# Patient Record
Sex: FEMALE | Race: WHITE
[De-identification: names, ages, dates, MRNs, and addresses within clinical notes are randomized per-mention and may not be internally consistent; named-entity substitution may affect disease eponyms.]

---

## 2017-07-17 ENCOUNTER — HOSPITAL ENCOUNTER (OUTPATIENT)
Dept: HOSPITAL 10 - OBT | Age: 42
Discharge: HOME | End: 2017-07-17
Attending: OBSTETRICS & GYNECOLOGY
Payer: MEDICAID

## 2017-07-17 VITALS
WEIGHT: 194.01 LBS | WEIGHT: 194.01 LBS | BODY MASS INDEX: 27.16 KG/M2 | BODY MASS INDEX: 27.16 KG/M2 | HEIGHT: 71 IN | HEIGHT: 71 IN

## 2017-07-17 VITALS — SYSTOLIC BLOOD PRESSURE: 113 MMHG | RESPIRATION RATE: 18 BRPM | HEART RATE: 89 BPM | DIASTOLIC BLOOD PRESSURE: 74 MMHG

## 2017-07-17 DIAGNOSIS — Z3A.40: ICD-10-CM

## 2017-07-17 DIAGNOSIS — O48.0: Primary | ICD-10-CM

## 2017-07-17 DIAGNOSIS — R10.9: ICD-10-CM

## 2017-07-17 PROCEDURE — 76818 FETAL BIOPHYS PROFILE W/NST: CPT

## 2017-07-17 PROCEDURE — G0463 HOSPITAL OUTPT CLINIC VISIT: HCPCS

## 2017-07-17 NOTE — RADRPT
PROCEDURE:   OB ultrasound for biophysical profile 

 

CLINICAL INDICATION:   Contractions

 

TECHNIQUE:   Multiple sonographic images of the pelvis were obtained.  Transabdominal views of the g
ravid uterus are available for review.  The images were reviewed on a PACS workstation.  

 

COMPARISON:   None 

 

FINDINGS:

 

Fetal breathing movement = 2/2

Fetal tone = 2/2

Fetal motion = 2/2

RAMIRO = 2/2

 

RAMIRO = 15.3 cm

Single live intrauterine pregnancy with fetal cardiac activity of 154 bpm.  Fetal position is cephal
ic.  The placenta is posterior.

 

IMPRESSION:

 

1.  Single live intrauterine gestation.  

2.  Biophysical profile = 8/8.

3.  RAMIRO = 15.3 cm. 

 

 

RPTAT: HH

_____________________________________________ 

.Annita Martinez MD, MD           Date    Time 

Electronically viewed and signed by .Annita Martinez MD, MD on 07/17/2017 09:38 

 

D:  07/17/2017 09:38  T:  07/17/2017 09:38

.G/

## 2017-07-20 ENCOUNTER — HOSPITAL ENCOUNTER (INPATIENT)
Dept: HOSPITAL 10 - OBT | Age: 42
LOS: 3 days | Discharge: HOME | End: 2017-07-23
Attending: OBSTETRICS & GYNECOLOGY | Admitting: OBSTETRICS & GYNECOLOGY
Payer: MEDICAID

## 2017-07-20 VITALS
BODY MASS INDEX: 26.76 KG/M2 | BODY MASS INDEX: 26.76 KG/M2 | WEIGHT: 191.14 LBS | HEIGHT: 71 IN | HEIGHT: 71 IN | WEIGHT: 191.14 LBS

## 2017-07-20 VITALS — SYSTOLIC BLOOD PRESSURE: 124 MMHG | DIASTOLIC BLOOD PRESSURE: 61 MMHG

## 2017-07-20 DIAGNOSIS — O48.0: Primary | ICD-10-CM

## 2017-07-20 DIAGNOSIS — Z3A.40: ICD-10-CM

## 2017-07-20 LAB
ADD SCAN DIFF: NO
APTT BLD: 24.7 SEC (ref 25–35)
BASOPHILS # BLD AUTO: 0 10^3/UL (ref 0–0.1)
BASOPHILS NFR BLD: 0.3 % (ref 0–2)
EOSINOPHIL # BLD: 0 10^3/UL (ref 0–0.5)
EOSINOPHIL NFR BLD: 0.3 % (ref 0–7)
ERYTHROBLAST% (NRBC) (M): 0 /100WBC (ref 0–0)
ERYTHROCYTE [DISTWIDTH] IN BLOOD BY AUTOMATED COUNT: 12.7 % (ref 11.5–14.5)
HCT VFR BLD CALC: 38.7 % (ref 37–47)
HGB BLD-MCNC: 13.1 G/DL (ref 12–16)
INR PPP: 0.97
LYMPHOCYTES # BLD AUTO: 1.5 10^3/UL (ref 0.8–2.9)
LYMPHOCYTES NFR BLD AUTO: 12.7 % (ref 15–51)
MCH RBC QN AUTO: 32.3 PG (ref 29–33)
MCHC RBC AUTO-ENTMCNC: 33.9 G/DL (ref 32–37)
MCV RBC AUTO: 95.6 FL (ref 82–101)
MONOCYTES # BLD: 0.7 10^3/UL (ref 0.3–0.9)
MONOCYTES NFR BLD: 6.1 % (ref 0–11)
NEUTROPHILS # BLD: 9.3 10^3/UL (ref 1.6–7.5)
NEUTROPHILS NFR BLD AUTO: 79.8 % (ref 39–77)
NRBC # BLD MANUAL: 0 10^3/UL (ref 0–0)
PLATELET # BLD: 138 10^3/UL (ref 140–415)
PMV BLD AUTO: 12.9 FL (ref 7.4–10.4)
PROTHROMBIN TIME: 12.9 SEC (ref 12.2–14.2)
PT RATIO: 1
RBC # BLD AUTO: 4.05 10^6/UL (ref 4.2–5.4)
WBC # BLD AUTO: 11.6 10^3/UL (ref 4.8–10.8)

## 2017-07-20 PROCEDURE — 85730 THROMBOPLASTIN TIME PARTIAL: CPT

## 2017-07-20 PROCEDURE — 86900 BLOOD TYPING SEROLOGIC ABO: CPT

## 2017-07-20 PROCEDURE — 86592 SYPHILIS TEST NON-TREP QUAL: CPT

## 2017-07-20 PROCEDURE — 87340 HEPATITIS B SURFACE AG IA: CPT

## 2017-07-20 PROCEDURE — 85025 COMPLETE CBC W/AUTO DIFF WBC: CPT

## 2017-07-20 PROCEDURE — G0463 HOSPITAL OUTPT CLINIC VISIT: HCPCS

## 2017-07-20 PROCEDURE — 86901 BLOOD TYPING SEROLOGIC RH(D): CPT

## 2017-07-20 PROCEDURE — 90715 TDAP VACCINE 7 YRS/> IM: CPT

## 2017-07-20 PROCEDURE — 76818 FETAL BIOPHYS PROFILE W/NST: CPT

## 2017-07-20 PROCEDURE — 62319: CPT

## 2017-07-20 PROCEDURE — 90716 VAR VACCINE LIVE SUBQ: CPT

## 2017-07-20 PROCEDURE — 85610 PROTHROMBIN TIME: CPT

## 2017-07-20 RX ADMIN — Medication SCH MLS/HR: at 11:51

## 2017-07-20 RX ADMIN — PYRIDOXINE HYDROCHLORIDE SCH MLS/HR: 100 INJECTION, SOLUTION INTRAMUSCULAR; INTRAVENOUS at 23:33

## 2017-07-20 RX ADMIN — PYRIDOXINE HYDROCHLORIDE SCH MLS/HR: 100 INJECTION, SOLUTION INTRAMUSCULAR; INTRAVENOUS at 18:31

## 2017-07-20 RX ADMIN — PYRIDOXINE HYDROCHLORIDE SCH MLS/HR: 100 INJECTION, SOLUTION INTRAMUSCULAR; INTRAVENOUS at 11:07

## 2017-07-20 NOTE — HP
Date/Time of Note


Date/Time of Note


DATE: 17 


TIME: 17:23





OB - History


Hx of Present Pregnancy


Free Text/Dictation


Admitted for elective induction at 40+ weeks


Last Menstrual Period:  Oct 10, 2016


Estimated Due Date:  2017


:  1


Para:  0


Prenatal Care:  Good Care


Ultrasounds:  Normal mid trimester US


Obstetrical Complications:  None


Medical Complications:  None





Past Family/Social History


*


Past Medical, Surgical, Family and Obstetric Histories reviewed from prenatal 

chart.


Blood Type:  A+


Rubella:  immune


RPR/VDRL:  Negative


GBS Status:  Negative


HBsAG:  Negative





OB  Admission Exam


Vital Signs


Vital Signs





 Vital Signs








  Date Time  Temp Pulse Resp B/P Pulse Ox O2 Delivery O2 Flow Rate FiO2


 


17 08:55 97.7   124/61  Room Air  











Physical Exam


HEENT:  WNL


Heart:  Rhythm Normal


Lungs:  Clear, Equal


Abdomen:  WNL


Extremities:  Normal


Reflexes:  Normal


Cervical Dilatation:  1cm


Effacement:  0%


Station:  -3


Membranes:  Intact


Fetal Heart Rate:  130's


Accelerations:  Accelerations Present


Decelerations:  Variable Decelerations


Varibility:  Marked


Contractions on Admission:  >10 Minutes Apart


Last 72 hours Lab Results


 CBC & BMP


17 11:07











OB  Assessment/Plan


Reason for admission:  induction of labor


Other Assessment:


40+ weeks gestation


Negative contraction stress test


Induction Method:  per Misoprostol Protocol











ABBY MARIO MD 2017 17:25

## 2017-07-20 NOTE — TRIAGE
===================================

OB Triage

===================================

Datetime Report Generated by CPN: 07/20/2017 10:23

   

   

===========================

Datetime: 07/20/2017 08:58

===========================

   

 Stage of Pregnancy:  OB Triage

 Assessment Type:  Triage

   

===================================

Maternal Assessment

===================================

   

 Level of Consciousness:  Fully Conscious

 DTR's/Clonus:  DTRs 2+; No Clonus

 Headache:  Denies

 Blurred Vision:  No

 Respiratory Effort:  Unlabored; Regular Rhythm; Equal Expansion

 Breath Sounds, Left:  Clear and Equal

 Breath Sounds, Right:  Clear and Equal

 Nausea/Vomiting:  Denies

 RUQ Epigastric Pain:  Denies

 Lower Extremities Edema:  None

     Degree:  None

 Upper Extremities Edema:  None

     Degree:  None

 Facial Edema:  None

 Temperature Route:  Oral

   

===================================

Fall Risk Assessment

===================================

   

 History of Falling:  (0) No

 Secondary Diagnosis:  (0) No

 Ambulatory Aid:  (0) Bedrest/Nurse Assist

 IV Therapy:  (0) No

 Gait:  (0) Normal/Bedrest/Immobile

 Mental Status:  (0) Oriented to Own Ability

 Fall Score:  0

 Fall Risk Score Definition:  No Risk: No action required

   

===================================

Labor Evaluation

===================================

   

 Monitor Mode:  External

   

===================================

Fetal Heart Rate

===================================

   

 FHR Baseline Rate:  145

 Monitor Mode:  External US

 Variability:  Moderate 6-25 bpm

 Accelerations:  15X15

 Decelerations:  None

   

===================================

Pain Assessment

===================================

   

 Pain Scale:  0

 Pain Presence:  None/Denies

 Pain Type:  N/A

   

===========================

Datetime: 07/20/2017 08:57

===========================

   

 Time of Arrival:  07/20/2017 08:43

 EGA:  40.3

 Arrived By:  Ambulatory

 Arrived From:  Home

 Chief Complaint:  F/UP NST BPP FOR POST DATES

 Fetal Movement:  Present

 Contractions:  Denies/Absent

 Rupture of Membranes:  Denies

 Vaginal Bleeding:  None

 Vaginal Discharge:  Denies

 Recent Sexual Intercouse:  Denies

 Abdominal Trauma:  Not Applicable

 Patient Complaints:  None

 Initial Plan:  NST, BPP FOR FOR POST DATES 

   

===========================

Datetime: 07/20/2017 08:56

===========================

   

   

===================================

Vaginal Exam

===================================

   

 Dilatation (cms):  1.0

 Effacement (%):  60

 Station:  -2

 Exam By:  VIVIAN HENDRICKSON 

   

===========================

Datetime: 07/17/2017 08:52

===========================

   

 Assessment Type:  Triage

   

===================================

Maternal Assessment

===================================

   

 Level of Consciousness:  Fully Conscious

 DTR's/Clonus:  DTRs 2+; No Clonus

 Headache:  Denies

 Blurred Vision:  No

 Respiratory Effort:  Unlabored; Regular Rhythm; Equal Expansion

 Breath Sounds, Left:  Clear and Equal

 Breath Sounds, Right:  Clear and Equal

 Nausea/Vomiting:  Denies

 RUQ Epigastric Pain:  Denies

 Lower Extremities Edema:  None

     Degree:  None

 Upper Extremities Edema:  None

     Degree:  None

 Facial Edema:  None

   

===================================

Fall Risk Assessment

===================================

   

 History of Falling:  (0) No

 Secondary Diagnosis:  (0) No

 Ambulatory Aid:  (0) Bedrest/Nurse Assist

 IV Therapy:  (0) No

 Gait:  (0) Normal/Bedrest/Immobile

 Mental Status:  (0) Oriented to Own Ability

 Fall Score:  0

 Fall Risk Score Definition:  No Risk: No action required

   

===========================

Datetime: 07/17/2017 08:51

===========================

   

 Time of Arrival:  07/17/2017 08:40

 EGA:  40.0

 Arrived By:  Ambulatory

 Arrived From:  Home

 Chief Complaint:  PT STATES SHE WAS TOLD TO COME IN TODAY FOR IOL.

 Fetal Movement:  Present

 Contractions:  Denies/Absent

 Rupture of Membranes:  Denies

 Vaginal Bleeding:  None

 Vaginal Discharge:  Denies

 Recent Sexual Intercouse:  Denies

 Abdominal Trauma:  Not Applicable

 Patient Complaints:  None

 Time Provider Notified:  07/17/2017 10:04

 Provider Notified:  ROSLYN

 Initial Plan:  NST/BPP

   

===========================

Datetime: 07/17/2017 08:49

===========================

   

   

===================================

Labor Evaluation

===================================

   

 Monitor Mode:  External

 Monitor Mode:  External US

## 2017-07-21 PROCEDURE — 3E033VJ INTRODUCTION OF OTHER HORMONE INTO PERIPHERAL VEIN, PERCUTANEOUS APPROACH: ICD-10-PCS | Performed by: OBSTETRICS & GYNECOLOGY

## 2017-07-21 PROCEDURE — 0UQGXZZ REPAIR VAGINA, EXTERNAL APPROACH: ICD-10-PCS | Performed by: OBSTETRICS & GYNECOLOGY

## 2017-07-21 RX ADMIN — PYRIDOXINE HYDROCHLORIDE SCH MLS/HR: 100 INJECTION, SOLUTION INTRAMUSCULAR; INTRAVENOUS at 02:00

## 2017-07-21 RX ADMIN — PYRIDOXINE HYDROCHLORIDE SCH MLS/HR: 100 INJECTION, SOLUTION INTRAMUSCULAR; INTRAVENOUS at 11:01

## 2017-07-21 RX ADMIN — PYRIDOXINE HYDROCHLORIDE SCH MLS/HR: 100 INJECTION, SOLUTION INTRAMUSCULAR; INTRAVENOUS at 08:27

## 2017-07-21 RX ADMIN — FENTANYL CIT 0.2 MG/100ML-ROPIV 0.2%-NACL 0.9% EPIDURAL INJ SCH ML: 2/0.2 SOLUTION at 09:15

## 2017-07-21 RX ADMIN — CEPHALEXIN SCH MG: 500 CAPSULE ORAL at 23:43

## 2017-07-21 RX ADMIN — FENTANYL CIT 0.2 MG/100ML-ROPIV 0.2%-NACL 0.9% EPIDURAL INJ SCH ML: 2/0.2 SOLUTION at 18:49

## 2017-07-21 RX ADMIN — Medication SCH MLS/HR: at 02:29

## 2017-07-21 RX ADMIN — PYRIDOXINE HYDROCHLORIDE SCH MLS/HR: 100 INJECTION, SOLUTION INTRAMUSCULAR; INTRAVENOUS at 18:50

## 2017-07-21 RX ADMIN — IBUPROFEN SCH MG: 600 TABLET ORAL at 23:44

## 2017-07-21 NOTE — LDN
Date/Time of Note


Date/Time of Note


DATE: 7/21/17 


TIME: 22:12





Delivery Summary


Vacuum extraction of a viable infant over midline episiotomy


Weeks of Gestation


40+


Assisted Vaginal Delivery:  Vacuum


Placenta Delivered:  Spontaneously, Intact & Complete


Meconium:  none


Episiotomy:  Yes


Indication for episiotomy


Prolonged second stage of the labor


Profuse vaginal bleeding


Perineal laceration:  0


Laceration repair:  


Vaginal laceration on


the right side was


repaired using 2-0


Vicryl stitch hemostasis


became secure. pisiotomy


was then repaired in


layers with 2-0 Vicryl


and 2-0 chromic


hemostasis became sick


Anesthesia type:  Epidural


Estimated blood loss:  700


Sponge & Needle done & correct:  Yes


All needle counts correct:  Yes


Any foreign bodies felt in the:  No


Problems:  





Infant Delivery Information


Sex


Infant Sex:  female





Apgars


1 Minute:  9


5 Minute:  9





Suctioning


Nose & mouth suctioned at delaney:  Yes


Delee suction performed:  No





Umbilical Cord


Umbilical cord with:  3 Vessels


Cord presentations:  nuchal cord


Nuchal cord present X:  1


Cord Blood was obtained:  Yes





Mother & Baby Disposition


Disposition


Mom & Baby to Maternity; Good:  Yes (Mother and baby were recovered in good 

condition)


Mom transferred to:  Other (Maternity)


Baby to NICU:  No











ABBY MARIO MD Jul 21, 2017 22:15

## 2017-07-22 VITALS — DIASTOLIC BLOOD PRESSURE: 53 MMHG | RESPIRATION RATE: 18 BRPM | HEART RATE: 71 BPM | SYSTOLIC BLOOD PRESSURE: 88 MMHG

## 2017-07-22 VITALS — RESPIRATION RATE: 16 BRPM | DIASTOLIC BLOOD PRESSURE: 51 MMHG | SYSTOLIC BLOOD PRESSURE: 102 MMHG | HEART RATE: 84 BPM

## 2017-07-22 VITALS — HEART RATE: 77 BPM | SYSTOLIC BLOOD PRESSURE: 96 MMHG | DIASTOLIC BLOOD PRESSURE: 55 MMHG | RESPIRATION RATE: 17 BRPM

## 2017-07-22 VITALS — DIASTOLIC BLOOD PRESSURE: 55 MMHG | HEART RATE: 80 BPM | SYSTOLIC BLOOD PRESSURE: 106 MMHG | RESPIRATION RATE: 19 BRPM

## 2017-07-22 VITALS — HEART RATE: 86 BPM | DIASTOLIC BLOOD PRESSURE: 57 MMHG | RESPIRATION RATE: 17 BRPM | SYSTOLIC BLOOD PRESSURE: 94 MMHG

## 2017-07-22 LAB
ABNORMAL IP MESSAGE: 1
BASOPHILS # BLD AUTO: 0.1 10^3/UL (ref 0–0.1)
BASOPHILS NFR BLD: 0.2 % (ref 0–2)
EOSINOPHIL # BLD: 0.1 10^3/UL (ref 0–0.5)
EOSINOPHIL NFR BLD: 0.3 % (ref 0–7)
ERYTHROCYTE [DISTWIDTH] IN BLOOD BY AUTOMATED COUNT: 12.9 % (ref 11.5–14.5)
HCT VFR BLD CALC: 29.8 % (ref 37–47)
HGB BLD-MCNC: 10.2 G/DL (ref 12–16)
LYMPHOCYTES # BLD AUTO: 1.2 10^3/UL (ref 0.8–2.9)
LYMPHOCYTES NFR BLD AUTO: 5.2 % (ref 15–51)
MCH RBC QN AUTO: 33 PG (ref 29–33)
MCHC RBC AUTO-ENTMCNC: 34.2 G/DL (ref 32–37)
MCV RBC AUTO: 96.4 FL (ref 82–101)
MONOCYTES # BLD: 1.2 10^3/UL (ref 0.3–0.9)
MONOCYTES NFR BLD: 5.2 % (ref 0–11)
NEUTROPHILS # BLD: 19.7 10^3/UL (ref 1.6–7.5)
NEUTROPHILS NFR BLD AUTO: 88.3 % (ref 39–77)
NRBC # BLD MANUAL: 0 10^3/UL (ref 0–0)
NRBC BLD AUTO-RTO: 0 /100WBC (ref 0–0)
PLATELET # BLD: 125 10^3/UL (ref 140–415)
PMV BLD AUTO: 13.2 FL (ref 7.4–10.4)
POSITIVE DIFF: (no result)
RBC # BLD AUTO: 3.09 10^6/UL (ref 4.2–5.4)
WBC # BLD AUTO: 22.3 10^3/UL (ref 4.8–10.8)

## 2017-07-22 RX ADMIN — IBUPROFEN SCH MG: 600 TABLET ORAL at 17:31

## 2017-07-22 RX ADMIN — CEPHALEXIN SCH MG: 500 CAPSULE ORAL at 11:47

## 2017-07-22 RX ADMIN — DOCUSATE SODIUM AND SENNOSIDES SCH TAB: 8.6; 5 TABLET, FILM COATED ORAL at 08:20

## 2017-07-22 RX ADMIN — IBUPROFEN SCH MG: 600 TABLET ORAL at 11:47

## 2017-07-22 RX ADMIN — DOCUSATE SODIUM AND SENNOSIDES SCH TAB: 8.6; 5 TABLET, FILM COATED ORAL at 20:53

## 2017-07-22 RX ADMIN — IBUPROFEN SCH MG: 600 TABLET ORAL at 05:45

## 2017-07-22 RX ADMIN — CEPHALEXIN SCH MG: 500 CAPSULE ORAL at 17:31

## 2017-07-22 RX ADMIN — AMPICILLIN SODIUM AND SULBACTAM SODIUM SCH MLS/HR: 2; 1 INJECTION, POWDER, FOR SOLUTION INTRAMUSCULAR; INTRAVENOUS at 19:46

## 2017-07-22 RX ADMIN — CEPHALEXIN SCH MG: 500 CAPSULE ORAL at 05:45

## 2017-07-22 RX ADMIN — IBUPROFEN SCH MG: 600 TABLET ORAL at 23:58

## 2017-07-22 NOTE — PD.PPDC
OB/GYN Discharge Instruction


Provider Information


Physician Information


S/P vaginal delivery





Diagnosis


Final Diagnosis:  S/P vaginal delivery





Condition


Patient Condition:  Good





Diet


Diet:  Resume Regular Diet





Activity/Restrictions








Activity:   Normal Activity





 May Shower














Restrictions:   Nothing in the Vagina





Return to Work or School:  Sep 11, 2017





Follow-up


Follow-up with Physician:  4, Week/Weeks





Return to clinic for








GYN Instructions:   Fever greater than 101














OB Instructions:   Breast Tenderness





 Depression

















ABBY MARIO MD Jul 22, 2017 18:26

## 2017-07-22 NOTE — DS
Date/Time of Note


Date/Time of Note


home next day 


DATE: 7/22/17 


TIME: 18:23





Obstetrical Discharge Record


Final Diagnosis


Final Diagnosis:  Term delivered


Other Final Diagnosis


S/P vaginal delivery





Vaginal Delivery


Obstetrical Delivery:  Episiotomy, Repaired, Vacuum Extraction, Laceration, 

Repaired





Complications


Augmentation:  Yes


Induction:  Yes





Condition on Discharge


Physical Assessment


Last Vitals:


see nurses notes


Voiding:  Yes


Bowel Movement:  Yes


Breast:  Soft, non-tender, Filling


Fundus:  Firm


Abdomen and Incision:


soft BS +


Episiotomy:


healing


Calf Tenderness:  No


Patient Condition:  Good











ABBY MARIO MD Jul 22, 2017 18:24

## 2017-07-23 VITALS — HEART RATE: 72 BPM | SYSTOLIC BLOOD PRESSURE: 106 MMHG | RESPIRATION RATE: 19 BRPM | DIASTOLIC BLOOD PRESSURE: 54 MMHG

## 2017-07-23 VITALS — SYSTOLIC BLOOD PRESSURE: 102 MMHG | DIASTOLIC BLOOD PRESSURE: 58 MMHG | HEART RATE: 62 BPM | RESPIRATION RATE: 16 BRPM

## 2017-07-23 LAB
ABNORMAL IP MESSAGE: 1
BASOPHILS # BLD AUTO: 0 10^3/UL (ref 0–0.1)
BASOPHILS NFR BLD: 0.1 % (ref 0–2)
EOSINOPHIL # BLD: 0.2 10^3/UL (ref 0–0.5)
EOSINOPHIL NFR BLD: 1.2 % (ref 0–7)
ERYTHROCYTE [DISTWIDTH] IN BLOOD BY AUTOMATED COUNT: 13.2 % (ref 11.5–14.5)
HCT VFR BLD CALC: 27.2 % (ref 37–47)
HGB BLD-MCNC: 9.1 G/DL (ref 12–16)
LYMPHOCYTES # BLD AUTO: 1.7 10^3/UL (ref 0.8–2.9)
LYMPHOCYTES NFR BLD AUTO: 12 % (ref 15–51)
MCH RBC QN AUTO: 32.3 PG (ref 29–33)
MCHC RBC AUTO-ENTMCNC: 33.5 G/DL (ref 32–37)
MCV RBC AUTO: 96.5 FL (ref 82–101)
MONOCYTES # BLD: 1.1 10^3/UL (ref 0.3–0.9)
MONOCYTES NFR BLD: 8 % (ref 0–11)
NEUTROPHILS # BLD: 11 10^3/UL (ref 1.6–7.5)
NEUTROPHILS NFR BLD AUTO: 77.5 % (ref 39–77)
NRBC # BLD MANUAL: 0 10^3/UL (ref 0–0)
NRBC BLD AUTO-RTO: 0 /100WBC (ref 0–0)
PLATELET # BLD: 125 10^3/UL (ref 140–415)
PMV BLD AUTO: 13.5 FL (ref 7.4–10.4)
POSITIVE DIFF: (no result)
RBC # BLD AUTO: 2.82 10^6/UL (ref 4.2–5.4)
WBC # BLD AUTO: 14.2 10^3/UL (ref 4.8–10.8)

## 2017-07-23 RX ADMIN — IBUPROFEN SCH MG: 600 TABLET ORAL at 05:48

## 2017-07-23 RX ADMIN — AMPICILLIN SODIUM AND SULBACTAM SODIUM SCH MLS/HR: 2; 1 INJECTION, POWDER, FOR SOLUTION INTRAMUSCULAR; INTRAVENOUS at 11:48

## 2017-07-23 RX ADMIN — IBUPROFEN SCH MG: 600 TABLET ORAL at 11:45

## 2017-07-23 RX ADMIN — AMPICILLIN SODIUM AND SULBACTAM SODIUM SCH MLS/HR: 2; 1 INJECTION, POWDER, FOR SOLUTION INTRAMUSCULAR; INTRAVENOUS at 05:48

## 2017-07-23 RX ADMIN — DOCUSATE SODIUM AND SENNOSIDES SCH TAB: 8.6; 5 TABLET, FILM COATED ORAL at 08:41

## 2017-07-23 RX ADMIN — AMPICILLIN SODIUM AND SULBACTAM SODIUM SCH MLS/HR: 2; 1 INJECTION, POWDER, FOR SOLUTION INTRAMUSCULAR; INTRAVENOUS at 00:28

## 2017-08-31 ENCOUNTER — HOSPITAL ENCOUNTER (EMERGENCY)
Dept: HOSPITAL 10 - FTE | Age: 42
Discharge: HOME | End: 2017-08-31
Payer: MEDICAID

## 2017-08-31 VITALS — SYSTOLIC BLOOD PRESSURE: 128 MMHG | DIASTOLIC BLOOD PRESSURE: 75 MMHG | RESPIRATION RATE: 17 BRPM | HEART RATE: 56 BPM

## 2017-08-31 VITALS
HEIGHT: 62 IN | WEIGHT: 158.73 LBS | BODY MASS INDEX: 29.21 KG/M2 | HEIGHT: 62 IN | BODY MASS INDEX: 29.21 KG/M2 | WEIGHT: 158.73 LBS

## 2017-08-31 DIAGNOSIS — N30.01: ICD-10-CM

## 2017-08-31 DIAGNOSIS — K80.20: Primary | ICD-10-CM

## 2017-08-31 DIAGNOSIS — R11.0: ICD-10-CM

## 2017-08-31 LAB
ADD UMIC: YES
ALBUMIN SERPL-MCNC: 4.8 G/DL (ref 3.3–4.9)
ALBUMIN/GLOB SERPL: 1.26 {RATIO}
ALP SERPL-CCNC: 399 IU/L (ref 42–121)
ALT SERPL-CCNC: 247 IU/L (ref 13–69)
ANION GAP SERPL CALC-SCNC: 21 MMOL/L (ref 8–16)
AST SERPL-CCNC: 423 IU/L (ref 15–46)
BACTERIA #/AREA URNS HPF: (no result) /HPF
BASOPHILS # BLD AUTO: 0 10^3/UL (ref 0–0.1)
BASOPHILS NFR BLD: 0.4 % (ref 0–2)
BILIRUB DIRECT SERPL-MCNC: 0 MG/DL (ref 0–0.2)
BILIRUB SERPL-MCNC: 0.4 MG/DL (ref 0.2–1.3)
BUN SERPL-MCNC: 11 MG/DL (ref 7–20)
CALCIUM SERPL-MCNC: 10 MG/DL (ref 8.4–10.2)
CHLORIDE SERPL-SCNC: 101 MMOL/L (ref 97–110)
CO2 SERPL-SCNC: 26 MMOL/L (ref 21–31)
COLOR UR: YELLOW
CREAT SERPL-MCNC: 0.87 MG/DL (ref 0.44–1)
EOSINOPHIL # BLD: 0 10^3/UL (ref 0–0.5)
EOSINOPHIL NFR BLD: 0.3 % (ref 0–7)
ERYTHROCYTE [DISTWIDTH] IN BLOOD BY AUTOMATED COUNT: 11.5 % (ref 11.5–14.5)
GLOBULIN SER-MCNC: 3.8 G/DL (ref 1.3–3.2)
GLUCOSE SERPL-MCNC: 110 MG/DL (ref 70–220)
GLUCOSE UR STRIP-MCNC: NEGATIVE MG/DL
HCT VFR BLD CALC: 45.8 % (ref 37–47)
HGB BLD-MCNC: 14.9 G/DL (ref 12–16)
KETONES UR STRIP.AUTO-MCNC: NEGATIVE MG/DL
LYMPHOCYTES # BLD AUTO: 1.1 10^3/UL (ref 0.8–2.9)
LYMPHOCYTES NFR BLD AUTO: 11.5 % (ref 15–51)
MCH RBC QN AUTO: 30.5 PG (ref 29–33)
MCHC RBC AUTO-ENTMCNC: 32.5 G/DL (ref 32–37)
MCV RBC AUTO: 93.7 FL (ref 82–101)
MONOCYTES # BLD: 0.6 10^3/UL (ref 0.3–0.9)
MONOCYTES NFR BLD: 6.3 % (ref 0–11)
MUCOUS THREADS #/AREA URNS HPF: (no result) /HPF
NEUTROPHILS NFR BLD AUTO: 81.1 % (ref 39–77)
NITRITE UR QL STRIP.AUTO: POSITIVE MG/DL
NRBC # BLD MANUAL: 0 10^3/UL (ref 0–0)
NRBC BLD AUTO-RTO: 0 /100WBC (ref 0–0)
PLATELET # BLD: 217 10^3/UL (ref 140–415)
PMV BLD AUTO: 12 FL (ref 7.4–10.4)
POTASSIUM SERPL-SCNC: 4.4 MMOL/L (ref 3.5–5.1)
PROT SERPL-MCNC: 8.6 G/DL (ref 6.1–8.1)
RBC # BLD AUTO: 4.89 10^6/UL (ref 4.2–5.4)
RBC # UR AUTO: NEGATIVE MG/DL
SODIUM SERPL-SCNC: 144 MMOL/L (ref 135–144)
SQUAMOUS #/AREA URNS HPF: (no result) /HPF
UR ASCORBIC ACID: NEGATIVE MG/DL
UR BILIRUBIN (DIP): NEGATIVE MG/DL
UR CLARITY: (no result)
UR PH (DIP): 8 (ref 5–9)
UR RBC: 2 /HPF (ref 0–5)
UR SPECIFIC GRAVITY (DIP): 1.02 (ref 1–1.03)
UR TOTAL PROTEIN (DIP): NEGATIVE MG/DL
UROBILINOGEN UR STRIP-ACNC: (no result) MG/DL
WBC # BLD AUTO: 9.9 10^3/UL (ref 4.8–10.8)
WBC # UR STRIP: (no result) LEU/UL

## 2017-08-31 PROCEDURE — 84703 CHORIONIC GONADOTROPIN ASSAY: CPT

## 2017-08-31 PROCEDURE — 96375 TX/PRO/DX INJ NEW DRUG ADDON: CPT

## 2017-08-31 PROCEDURE — 80053 COMPREHEN METABOLIC PANEL: CPT

## 2017-08-31 PROCEDURE — 76705 ECHO EXAM OF ABDOMEN: CPT

## 2017-08-31 PROCEDURE — 81001 URINALYSIS AUTO W/SCOPE: CPT

## 2017-08-31 PROCEDURE — 85025 COMPLETE CBC W/AUTO DIFF WBC: CPT

## 2017-08-31 PROCEDURE — 83690 ASSAY OF LIPASE: CPT

## 2017-08-31 PROCEDURE — C9113 INJ PANTOPRAZOLE SODIUM, VIA: HCPCS

## 2017-08-31 PROCEDURE — 96374 THER/PROPH/DIAG INJ IV PUSH: CPT

## 2017-08-31 NOTE — ERD
ER Documentation


Chief Complaint


Date/Time


DATE: 8/31/17 


TIME: 17:07


Chief Complaint


Complains of abdominal pain x 3 days





HPI


This 42-year-old female presents to emergency department for complaint of 

epigastric pain radiating to her back.  Patient reports she has feels the back 

pain bilaterally.  Patient denies any dysuria, vomiting, reports intermittent 

nausea, patient is one-month postpartum, breast-feeding, states she went to a 

clinic last week was started on ranitidine 150 mg 1 tab p.o. twice daily, given 

hydralazine for nausea.  Patient has been taking medication as prescribed, pt 

reports pain is worse today medication is not helping.  Patient denies chest 

pain, shortness of breath, midsternal burning or pain.  Pain is localized to 

epigastric and periumbilical.





ROS


All systems reviewed and are negative except as per history of present illness.





Medications


Home Meds


Active Scripts


Hydrocodone/Acetaminophen (Norco 5-325 Tablet) 1 Each Tablet, 1 TAB PO Q6 Y for 

PAIN, #20 TAB


   Prov:OYEL,FRANCISCO         8/31/17


Tramadol HCl (Tramadol HCl) 50 Mg Tablet, 50 MG PO TID Y for PAIN, #20 TAB


   Prov:YOEL,FRANCISCO         8/31/17


Nitrofurantoin Monohyd Macrocr* (Macrobid*) 100 Mg Capsr, 100 MG PO HS for 7 

Days, CAP


   Prov:YOEL,FRANCISCO         8/31/17


Ibuprofen* (Ibuprofen*) 600 Mg Tablet, 600 MG PO Q6, #30 TAB 0 Refills


   Prov:ABBY MARIO MD         7/23/17


Amoxicillin/Potassium Clav (Amox-Clav 875-125 mg Tablet) 875-125 mg Tab, 875 MG 

PO BID, #14 TAB 0 Refills


   Prov:ABBY MARIO MD         7/23/17


Reported Medications


Multivit/Min/Fol Ac/Iron/Pren* (Prenatal S*) 1 Tab Tab, 1 TAB PO DAILY, TAB


   7/17/17





Allergies


Allergies:  


Coded Allergies:  


     No Known Allergy (Unverified , 7/17/17)





PMhx/Soc


Medical and Surgical Hx:  pt denies Medical Hx, pt denies Surgical Hx


History of Surgery:  No


Anesthesia Reaction:  No


Hx Neurological Disorder:  No


Hx Respiratory Disorders:  No


Hx Cardiac Disorders:  No


Hx Psychiatric Problems:  No


Hx Miscellaneous Medical Probl:  No


Hx Alcohol Use:  No


Hx Substance Use:  No


Hx Tobacco Use:  No


Smoking Status:  Never smoker





Physical Exam


Vitals





Vital Signs








  Date Time  Temp Pulse Resp B/P Pulse Ox O2 Delivery O2 Flow Rate FiO2


 


8/31/17 20:56  56 17 128/75 100 Room Air  


 


8/31/17 15:44 97.2 71 20 119/75 98   





Vitals stable, triage notes reviewed


Physical Exam


Const:     Well-nourished well hydrated female obvious discomfort no acute 

distress


Head:   


Eyes:    Normal Conjunctiva PERRLA, EOMI, no jaundice


ENT:    


Neck:           


Resp:    Clear to auscultation bilaterally No rales wheezes or rhonchi


Cardio:    S1-S2, no S3- S4.Regular rate and rhythm, No murmur


Abd   Soft symmetric non-distended abdomen, epigastric tenderness, midline 

tenderness to umbilicus.  No right upper quadrant tenderness or left lower 

quadrant tenderness, no CVA tenderness.


Skin:    


Back:    No midline or flank tenderness


Ext:    


Neur:    Awake and alert


Psych:    Normal Mood and Affect


Result Diagram:  


8/31/17 1810 8/31/17 1810





Results 24 hrs





 Laboratory Tests








Test


  8/31/17


18:10


 


White Blood Count 9.910^3/ul 


 


Red Blood Count 4.8910^6/ul 


 


Hemoglobin 14.9g/dl 


 


Hematocrit 45.8% 


 


Mean Corpuscular Volume 93.7fl 


 


Mean Corpuscular Hemoglobin 30.5pg 


 


Mean Corpuscular Hemoglobin


Concent 32.5g/dl 


 


 


Red Cell Distribution Width 11.5% 


 


Platelet Count 91262^3/UL 


 


Mean Platelet Volume 12.0fl 


 


Neutrophils % 81.1% 


 


Lymphocytes % 11.5% 


 


Monocytes % 6.3% 


 


Eosinophils % 0.3% 


 


Basophils % 0.4% 


 


Nucleated Red Blood Cells % 0.0/100WBC 


 


Neutrophils # (Manual) 8.110^3/ul 


 


Lymphocytes # 1.110^3/ul 


 


Monocytes # 0.610^3/ul 


 


Eosinophils # 0.010^3/ul 


 


Basophils # 0.010^3/ul 


 


Nucleated Red Blood Cells # 0.010^3/ul 


 


Urine Color YELLOW 


 


Urine Clarity


  SLIGHTLY


CLOUDY


 


Urine pH 8.0 


 


Urine Specific Gravity 1.016 


 


Urine Ketones NEGATIVEmg/dL 


 


Urine Nitrite POSITIVEmg/dL 


 


Urine Bilirubin NEGATIVEmg/dL 


 


Urine Urobilinogen 1+mg/dL 


 


Urine Leukocyte Esterase 3+Lori/ul 


 


Urine Microscopic RBC 2/HPF 


 


Urine Microscopic WBC 43/HPF 


 


Urine Squamous Epithelial


Cells FEW/HPF 


 


 


Urine Bacteria MANY/HPF 


 


Urine Mucus MODERATE/HPF 


 


Urine Hemoglobin NEGATIVEmg/dL 


 


Urine Glucose NEGATIVEmg/dL 


 


Urine Total Protein NEGATIVEmg/dl 


 


Urine Pregnancy Test NEGATIVE 


 


Sodium Level 144mmol/L 


 


Potassium Level 4.4mmol/L 


 


Chloride Level 101mmol/L 


 


Carbon Dioxide Level 26mmol/L 


 


Anion Gap 21 


 


Blood Urea Nitrogen 11mg/dl 


 


Creatinine 0.87mg/dl 


 


Glucose Level 110mg/dl 


 


Calcium Level 10.0mg/dl 


 


Total Bilirubin 0.4mg/dl 


 


Direct Bilirubin 0.00mg/dl 


 


Indirect Bilirubin 0.4mg/dl 


 


Aspartate Amino Transf


(AST/SGOT) 423IU/L 


 


 


Alanine Aminotransferase


(ALT/SGPT) 247IU/L 


 


 


Alkaline Phosphatase 399IU/L 


 


Total Protein 8.6g/dl 


 


Albumin 4.8g/dl 


 


Globulin 3.80g/dl 


 


Albumin/Globulin Ratio 1.26 


 


Lipase 134U/L 








 Current Medications








 Medications


  (Trade)  Dose


 Ordered  Sig/Trang


 Route


 PRN Reason  Start Time


 Stop Time Status Last Admin


Dose Admin


 


 Sodium Chloride


  (NS)  1,000 ml @ 


 1,000 mls/hr  Q1H STAT


 IV


   8/31/17 17:10


 8/31/17 18:09 DC 8/31/17 17:36


 


 


 Ondansetron HCl


  (Zofran Inj)  4 mg  ONCE  STAT


 IV


   8/31/17 17:10


 8/31/17 17:16 DC 8/31/17 17:38


 


 


 Pantoprazole


  (Protonix Iv)  40 mg  ONCE  ONCE


 IV


   8/31/17 17:30


 8/31/17 17:31 DC 8/31/17 17:38


 


 


 Acetaminophen/


 Hydrocodone Bitart


  (Norco (10/325))  1 tab  ONCE  ONCE


 PO


   8/31/17 17:30


 8/31/17 17:31 DC  


 


 


 Morphine Sulfate


  (morphine)  4 mg  ONCE  STAT


 IV


   8/31/17 17:38


 8/31/17 17:39 DC 8/31/17 17:43


 





Interpretation text


CBC shows no evidence of hemorrhage or infection


Chemistry shows no evidence of significant electrolyte abnormalities or renal 

insufficiency


Liver function tests shows no evidence of acute biliary dysfunction, LFTs 

abnormal


Lipase shows no evidence of acute pancreatitis


Urinalysis positive for evidence of nitrates, leukocytosis, and microscopic 

hematuria





Procedures/MDM





PROCEDURE:   US Abdomen Limited . 


 


CLINICAL INDICATION:   Abdominal pain  


 


TECHNIQUE:   Multiple real-time images were acquired of the patient's right 

upper quadrant abdomen utilizing a high resolution transducer. 


 


COMPARISON:   None 


 


FINDINGS:


The liver measures 13.9 cm and demonstrates a normal echogenicity. The 

gallbladder is filled with a  moderate amount of bile. Small echogenic, 

shadowing stones , mixed with sludge identified in the mid gallbladder.  The 

gallbladder wall is not thickened at 1.3 mm. No pericholecystic fluid is noted. 

The common bile duct measures 7.3 mm in diameter.  The visualized portions of 

the proximal pancreas are unremarkable. The tail of the pancreas is not well 

visualized.


 


Antegrade flow is seen in the portal vein.


 


Right kidney measures 9.3 cm.  Right kidney demonstrates a normal echogenicity.

  No hydronephrosis, masses or stones are noted.


 


IMPRESSION:


Cholelithiasis.


 


Mildly dilated common bile duct.  Etiology is uncertain.  Distal common bile 

duct obstruction is not excluded.  If further characterization is needed MRCP 

or ERCP could be helpful.


 


Tail of the pancreas not well visualized. If characterization of this structure 

is needed repeat exam or CT/MRI is recommended. 


 


Electronically viewed and signed by .Jake Pacheco MD, MD on 08/31/2017 18:08 








 This 42-year-old female postpartum 1 month reports epigastric pain, midline 

umbilical pain without right upper quadrant pain or CVA tenderness.  Patient 

reports severe pain and nausea denies dysuria, hematuria, or vomiting.  Patient'

s differential diagnosis includes but not limited to appendicitis, pancreatitis

, cholelithiasis, cholecystitis, biliary colic.  Emergency room course includes 

a liter of normal saline, 4 mg of morphine, Protonix IV, Zofran laboratory 

testing CBC without leukocytosis or evidence of anemia, abnormal ALT, AST, 

total bilirubin is normal without indication of obstruction.  I suspect patient 

has passed a stone.  Electrolytes without imbalance, or renal insufficiency.  

Urinalysis positive for nitrates leukocytosis and hematuria suggestive of a 

urinary tract infection.  Gallbladder ultrasound positive for cholelithiasis, 

documents mild dilated common bile duct etiology is uncertain, distal common 

bile duct obstruction is not excluded.   plan to discharge patient home with 

Macrobid 100 mg p.o. twice daily 7 days, Norco 1 tab p.o. every 6 hours as 

needed pain greater than 6/10 on pain scale, Ultram 50 mg 1 tab p.o. 3 times 

daily as needed pain less than 5 out of 10 on pain scale.  Strict return to 

emergency room precautions, follow-up with primary care physician for plan on 

options to treat cholelithiasis.  Return to emergency department for pain, 

nausea, vomiting, symptoms worsening on current plan of care, implement clear 

liquid diet advance as tolerated.  Patient instructed to not eat fatty foods, 

red meat or dairy.  Patient is stable with no new complaints during ER course, 

clinically there is no current evidence to suggest meningitis, sepsis, acute 

abdomen,  pulmonary embolism or any other emergent condition appearing to 

require further evaluation or hospitalization.  I feel the patient is stable 

for discharge at this time.  I have discussed results, examination findings, 

the treatment plan with the patient and family present prior to discharge.  

Indications for emergent reevaluation, side effects of medication were also 

discussed.  All questions were answered.  Patient verbalizes understanding and 

agrees with plan of care.





Departure


Diagnosis:  


 Primary Impression:  


 Cholelithiases


 Cholelithiasis location:  gallbladder  Cholecystitis presence:  without 

cholecystitis  Biliary obstruction:  without biliary obstruction  Qualified Code

:  K80.20 - Calculus of gallbladder without cholecystitis without obstruction


 Additional Impression:  


 UTI (urinary tract infection)


 Urinary tract infection type:  acute cystitis  Hematuria presence:  with 

hematuria  Qualified Code:  N30.01 - Acute cystitis with hematuria


Condition:  Good


Patient Instructions:  Gallstones, Treating Gallstones, When Your Child Has a 

Urinary Tract Infection (UTI)


Referrals:  


[facility]





Additional Instructions:  


Thank you for for coming to [facility] for your care today. 

Please ask your nurse or provider if you have questions about your care today 

and do not leave until all your questions have been answered.  Please use any 

medications given as directed and follow-up with your doctor (or the doctor you 

were referred to) in the next 2-3 days. If you do not have a primary care 

doctor you may follow up at the [facility] (listed below). You may also 

use motrin and tylenol as needed for fever and/or pain unless instructed 

otherwise by your provider or nurse. Indications for more urgent follow-up have 

been discussed, but you may return to the Emergency Department at ANY time for 

any worrisome or worsening symptoms.





If you have abdominal pain, please know that no test or exam you received is 

perfect and you should follow up within 8 hours for continued pain.





If you had any imaging studies today, such as an X-Ray or CT Scan, these 

studies will be reviewed later by a radiologist. You will be called if there 

are important findings that were not identified today, so make sure the contact 

information you provided at registration is correct.





If you received any narcotic pain control medicine today, such as Vicodin, 

Morphine or Dilaudid, your coordination and judgment may be affected for a 

number of hours. Please do not drive or operate heavy machinery, and you may 

want someone to assist you at home. If you were given a prescription for 

narcotic medication, be aware that it is very addictive- use sparingly and only 

if necessary.











FRANCISCO DIALLO Aug 31, 2017 17:10

## 2019-02-25 ENCOUNTER — HOSPITAL ENCOUNTER (INPATIENT)
Dept: HOSPITAL 91 - OBT | Age: 44
LOS: 3 days | Discharge: HOME | End: 2019-02-28
Payer: MEDICAID

## 2019-02-25 ENCOUNTER — HOSPITAL ENCOUNTER (INPATIENT)
Dept: HOSPITAL 10 - L-D | Age: 44
LOS: 3 days | Discharge: HOME | End: 2019-02-28
Attending: OBSTETRICS & GYNECOLOGY | Admitting: OBSTETRICS & GYNECOLOGY
Payer: MEDICAID

## 2019-02-25 VITALS — HEART RATE: 82 BPM | SYSTOLIC BLOOD PRESSURE: 107 MMHG | RESPIRATION RATE: 82 BRPM | DIASTOLIC BLOOD PRESSURE: 66 MMHG

## 2019-02-25 VITALS
HEIGHT: 71 IN | BODY MASS INDEX: 24.81 KG/M2 | WEIGHT: 177.25 LBS | HEIGHT: 71 IN | WEIGHT: 177.25 LBS | BODY MASS INDEX: 24.81 KG/M2

## 2019-02-25 DIAGNOSIS — Z3A.40: ICD-10-CM

## 2019-02-25 LAB
ADD MAN DIFF?: NO
BASOPHIL #: 0 10^3/UL (ref 0–0.1)
BASOPHILS %: 0.3 % (ref 0–2)
EOSINOPHILS #: 0 10^3/UL (ref 0–0.5)
EOSINOPHILS %: 0.2 % (ref 0–7)
HEMATOCRIT: 37.7 % (ref 37–47)
HEMOGLOBIN: 12.4 G/DL (ref 12–16)
HEPATITIS B SURFACE ANTIGEN: NEGATIVE
IMMATURE GRANS #M: 0.06 10^3/UL (ref 0–0.03)
IMMATURE GRANS % (M): 0.6 % (ref 0–0.43)
INR: 0.87
LYMPHOCYTES #: 1.2 10^3/UL (ref 0.8–2.9)
LYMPHOCYTES %: 13.2 % (ref 15–51)
MEAN CORPUSCULAR HEMOGLOBIN: 32.1 PG (ref 29–33)
MEAN CORPUSCULAR HGB CONC: 32.9 G/DL (ref 32–37)
MEAN CORPUSCULAR VOLUME: 97.7 FL (ref 82–101)
MEAN PLATELET VOLUME: 12.6 FL (ref 7.4–10.4)
MONOCYTE #: 0.5 10^3/UL (ref 0.3–0.9)
MONOCYTES %: 5.8 % (ref 0–11)
NEUTROPHIL #: 7.5 10^3/UL (ref 1.6–7.5)
NEUTROPHILS %: 79.9 % (ref 39–77)
NUCLEATED RED BLOOD CELLS #: 0 10^3/UL (ref 0–0)
NUCLEATED RED BLOOD CELLS%: 0 /100WBC (ref 0–0)
PARTIAL THROMBOPLASTIN TIME: 24.4 SEC (ref 23–35)
PLATELET COUNT: 141 10^3/UL (ref 140–415)
PROTIME: 11.9 SEC (ref 11.9–14.9)
PT RATIO: 0.9
RAPID PLASMA REAGIN: NONREACTIVE
RED BLOOD COUNT: 3.86 10^6/UL (ref 4.2–5.4)
RED CELL DISTRIBUTION WIDTH: 13 % (ref 11.5–14.5)
WHITE BLOOD COUNT: 9.4 10^3/UL (ref 4.8–10.8)

## 2019-02-25 PROCEDURE — 90716 VAR VACCINE LIVE SUBQ: CPT

## 2019-02-25 PROCEDURE — 85730 THROMBOPLASTIN TIME PARTIAL: CPT

## 2019-02-25 PROCEDURE — 86900 BLOOD TYPING SEROLOGIC ABO: CPT

## 2019-02-25 PROCEDURE — 86592 SYPHILIS TEST NON-TREP QUAL: CPT

## 2019-02-25 PROCEDURE — 76818 FETAL BIOPHYS PROFILE W/NST: CPT

## 2019-02-25 PROCEDURE — 62319: CPT

## 2019-02-25 PROCEDURE — 85610 PROTHROMBIN TIME: CPT

## 2019-02-25 PROCEDURE — 76815 OB US LIMITED FETUS(S): CPT

## 2019-02-25 PROCEDURE — 86901 BLOOD TYPING SEROLOGIC RH(D): CPT

## 2019-02-25 PROCEDURE — 85025 COMPLETE CBC W/AUTO DIFF WBC: CPT

## 2019-02-25 PROCEDURE — G0463 HOSPITAL OUTPT CLINIC VISIT: HCPCS

## 2019-02-25 PROCEDURE — 86850 RBC ANTIBODY SCREEN: CPT

## 2019-02-25 PROCEDURE — 87340 HEPATITIS B SURFACE AG IA: CPT

## 2019-02-25 RX ADMIN — PYRIDOXINE HYDROCHLORIDE 1 MLS/HR: 100 INJECTION, SOLUTION INTRAMUSCULAR; INTRAVENOUS at 11:23

## 2019-02-25 RX ADMIN — PYRIDOXINE HYDROCHLORIDE 1 MLS/HR: 100 INJECTION, SOLUTION INTRAMUSCULAR; INTRAVENOUS at 17:08

## 2019-02-25 RX ADMIN — PYRIDOXINE HYDROCHLORIDE 1 MLS/HR: 100 INJECTION, SOLUTION INTRAMUSCULAR; INTRAVENOUS at 22:02

## 2019-02-25 RX ADMIN — PYRIDOXINE HYDROCHLORIDE SCH MLS/HR: 100 INJECTION, SOLUTION INTRAMUSCULAR; INTRAVENOUS at 17:08

## 2019-02-25 RX ADMIN — FENTANYL CIT 0.2 MG/100ML-ROPIV 0.2%-NACL 0.9% EPIDURAL INJ SCH ML: 2/0.2 SOLUTION at 21:13

## 2019-02-25 RX ADMIN — Medication 1 MLS/HR: at 12:04

## 2019-02-25 RX ADMIN — PYRIDOXINE HYDROCHLORIDE SCH MLS/HR: 100 INJECTION, SOLUTION INTRAMUSCULAR; INTRAVENOUS at 20:14

## 2019-02-25 RX ADMIN — FENTANYL CIT 0.2 MG/100ML-ROPIV 0.2%-NACL 0.9% EPIDURAL INJ 1 ML: 2/0.2 SOLUTION at 21:13

## 2019-02-25 RX ADMIN — PYRIDOXINE HYDROCHLORIDE SCH MLS/HR: 100 INJECTION, SOLUTION INTRAMUSCULAR; INTRAVENOUS at 22:02

## 2019-02-25 RX ADMIN — PYRIDOXINE HYDROCHLORIDE SCH MLS/HR: 100 INJECTION, SOLUTION INTRAMUSCULAR; INTRAVENOUS at 11:23

## 2019-02-25 RX ADMIN — PYRIDOXINE HYDROCHLORIDE 1 MLS/HR: 100 INJECTION, SOLUTION INTRAMUSCULAR; INTRAVENOUS at 20:14

## 2019-02-25 NOTE — TRIAGE
===================================

OB Triage

===================================

Datetime Report Generated by CPN: 02/25/2019 10:18

   

   

===========================

Datetime: 02/25/2019 10:11

===========================

   

   

===================================

Vaginal Exam

===================================

   

 Dilatation (cms):  2.0

 Effacement (%):  40

 Station:  -3

 Exam By:  MAY

 Vaginal Bleeding:  None

 Cervix, Consistency:  Moderate

 Cervix, Position:  Posterior

   

===========================

Datetime: 02/25/2019 09:37

===========================

   

   

===================================

Labor Evaluation

===================================

   

 Frequency:  X3

 Monitor Mode:  External

 Duration (sec)2399:  

 Quality:  Mild

 Pattern:  Normal: <= 5 Contractions in 10 Minutes

 Resting Tone [place]:  Relaxed

   

===================================

Fetal Heart Rate

===================================

   

 FHR Baseline Rate:  150

 Monitor Mode:  External US

 FHR Baseline Changes:  No Baseline Change

 Variability:  Moderate 6-25 bpm

 Accelerations:  15X15

 Decelerations:  Late

 Category:  Category II

   

===================================

Pain Assessment

===================================

   

 Pain Scale:  0

 Pain Presence:  None/Denies

 Pain Type:  N/A

 Pain Goal:  0

 Membrane Status:  Intact

   

===========================

Datetime: 02/25/2019 09:04

===========================

   

 Stage of Pregnancy:  OB Triage

 Assessment Type:  Triage

 Time of Arrival:  02/25/2019 08:56

 EGA:  40.0

 Arrived By:  Ambulatory

 Arrived From:  Home

 Chief Complaint:  SENT BY CLINIC DUE TO DUE DATE 

 Fetal Movement:  Present

 Contractions:  Denies/Absent

 Rupture of Membranes:  Denies

 Vaginal Discharge:  Denies

 Recent Sexual Intercouse:  Denies

 Patient Complaints:  None

 Time Provider Notified:  02/25/2019 10:10

 Provider Notified:  DR. MCGOWAN

 Initial Plan:  EFM, BPP, EFW

   

===================================

Maternal Assessment

===================================

   

 Level of Consciousness:  Fully Conscious

 DTR's/Clonus:  DTRs 2+; No Clonus

 Headache:  Denies

 Blurred Vision:  No

 Respiratory Effort:  Unlabored; Regular Rhythm; Equal Expansion

 Nausea/Vomiting:  Denies

 RUQ Epigastric Pain:  Denies

 Lower Extremities Edema:  None

     Degree:  None

 Upper Extremities Edema:  None

     Degree:  None

 Facial Edema:  None

 Temperature Route:  Axillary

   

===================================

Fall Risk Assessment

===================================

   

 History of Falling:  (0) No

 Secondary Diagnosis:  (0) No

 Ambulatory Aid:  (0) Bedrest/Nurse Assist

 IV Therapy:  (0) No

 Gait:  (0) Normal/Bedrest/Immobile

 Mental Status:  (0) Oriented to Own Ability

 Fall Score:  0

 Fall Risk Score Definition:  No Risk: No action required

 Monitor Mode:  External

 Monitor Mode:  External US

   

===================================

Pain Assessment

===================================

   

 Pain Scale:  0

 Pain Presence:  None/Denies

 Pain Type:  N/A

 Pain Goal:  0

 Membrane Status:  Intact

## 2019-02-25 NOTE — PREAC
Date/Time of Note


Date/Time of Note


DATE: 19 


TIME: 20:23





Anesthesia Eval and Record


Evaluation


Time Pre-Procedure Interview


DATE: 19 


TIME: 20:23


Age


43


Sex


female


NPO:  8 hrs


Preoperative diagnosis


Labor Pain


Planned procedure


Labor Epidural





Past Medical History


Past Medical History:  Includes


Pregnancy:  : (2), Para: (1), Gestational age: (40)





Surgery & Anesthesia Issues


No known issue





Meds


Anticoagulation:  No


Beta Blocker within 24 hr:  No


Reason Beta Blocker not given:  Pt. not on B-Blocker


Active Scripts


Hydrocodone/Acetaminophen (Norco 5-325 Tablet) 1 Each Tablet, 1 TAB PO Q6 PRN 


for PAIN, #20 TAB


   Prov:YOEL,FRANCISCO         17


Tramadol HCl (Tramadol HCl) 50 Mg Tablet, 50 MG PO TID PRN for PAIN, #20 TAB


   Prov:YOEL,FRANCISCO         17


Nitrofurantoin Monohyd Macrocr* (Macrobid*) 100 Mg Capsr, 100 MG PO HS for 7 


Days, CAP


   Prov:YOEL,FRANCISCO         17


Ibuprofen* (Ibuprofen*) 600 Mg Tablet, 600 MG PO Q6, #30 TAB 0 Refills


   Prov:ABBY MARIO MD         17


Amoxicillin/Potassium Clav (Amox-Clav 875-125 mg Tablet) 875-125 mg Tab, 875 MG 


PO BID, #14 TAB 0 Refills


   Prov:ABBY MARIO MD         17


Reported Medications


Multivit/Min/Fol Ac/Iron/Pren* (Prenatal S*) 1 Tab Tab, 1 TAB PO DAILY, TAB


   17





Current Medications


Lactated Ringer's 1,000 ml @  125 mls/hr Q8H IV  Last administered on 19at 


20:14; Admin Dose 125 MLS/HR;  Start 19 at 10:20


Butorphanol Tartrate (Stadol) 2 mg Q2H  PRN IV .PAIN;  Start 19 at 10:30


Lidocaine (Xylocaine 1% (Mpf)) 30 ml ONCE PRN INJ .EPISIOTOMY;  Start 19 at


10:30


Oxytocin/Lactated Ringer's 500 ml @  500 mls/hr ONCE POST PARTUM IV ;  Start 


19 at 10:30


Oxytocin/Lactated Ringer's 500 ml @  125 mls/hr POST PARTUM IV ;  Start 19 


at 10:30


Ibuprofen (Motrin) 600 mg ONCE PRN PO .PAIN 1-5;  Start 19 at 10:30


Oxycodone/Aspirin (Percodan) 2 tab ONCE PRN PO .PAIN 6-10;  Start 19 at 


10:30


Lactated Ringer's 1,000 ml @  2,000 mls/hr Q30M PRN IV .ANESTHESIA;  Start 


19 at 10:20


Oxytocin/Lactated Ringer's 500 ml @ 0 mls/hr ONCE PRN IV .VAGINAL BLEEDING;  


Start 19 at 10:30


Methylergonovine Maleate (Methergine) 0.2 mg ONCE PRN IM .VAGINAL BLEEDING;  


Start 19 at 10:30


Carboprost Tromethamine (Hemabate) 250 mcg ONCE PRN IM .VAGINAL BLEEDING;  Start


19 at 10:30


Misoprostol (Cytotec) 1,000 mcg ONCE PRN IL .VAGINAL BLEEDING;  Start 19 at


10:30


Mineral Oil (Muri-Lube)  PRN  PRN TOP lubricant;  Start 19 at 10:30


Oxytocin/Lactated Ringer's 500 ml @ 0 mls/hr FOR INDUCTION IV  Last administered


on 19at 12:04; Admin Dose 0 MLS/HR;  Start 19 at 12:00


Meds reviewed:  Yes





Allergies


Coded Allergies:  


     No Known Allergy (Unverified , 17)


Allergies Reviewed:  Yes





Labs/Studies


Labs Reviewed:  Reviewed by anesthesiologist


Result Diagram:  


19 1113





Laboratory Tests


19 11:13











Blood Bank


                  Test
                         19
11:13


                  Antibody Screen               NEGATIVE


                  Blood Type                    A POSITIVE


                  Rh Immune Globulin Candidate  NO





Pregnancy test:  Positive


Studies:  ECG (n/a), CXR (n/a)





Pre-procedure Exam


Last vitals





Vital Signs


  Date      Temp  Pulse  Resp  B/P (MAP)   Pulse Ox  O2          O2 Flow    FiO2


Time                                                 Delivery    Rate


   19  97.0     82    82      107/66            Room Air


     09:10                           (80)





Airway:  Adequate mouth opening, Adequate thyromental dist


Mallampati:  Mallampati II


Teeth:  Normal


Lung:  Normal


Heart:  Normal





ASA Physical Status


ASA physical status:  2


Emergency:  None





Planned Anesthetic


Neuraxial:  Epidural





Planned Pain Management


Epidural





Pre-operative Attestations


Prior to commencing anesthesia and surgery, the patient was re-evaluated, there 


was verification of:


*The patient's identity


*The results of appropriate recent lab work and preoperative vital signs


*The above evaluation not changing prior to induction


*Anesthetic plan, risk benefits, alternative and complications discussed with 


patient/family; questions answered; patient/family understands, accepts and 


wishes to proceed.











GIO CONNER MD              2019 20:24

## 2019-02-25 NOTE — PAC
Date/Time of Note


Date/Time of Note


DATE: 2/25/19 


TIME: 20:26





Post-Anesthesia Notes


Post-Anesthesia Note


Last documented vital signs





Vital Signs


  Date      Temp  Pulse  Resp  B/P (MAP)   Pulse Ox  O2          O2 Flow    FiO2


Time                                                 Delivery    Rate


   2/25/19  98.1     82    82      107/66       100  Room Air


     20:20                           (80)





Activity:  WNL


Respiratory function:  WNL


Cardiovascular function:  WNL


Mental status:  Baseline


Pain reasonably controlled:  Yes


Hydration appropriate:  Yes


Nausea/Vomiting absent:  Yes











GIO CONNER MD              Feb 25, 2019 20:26

## 2019-02-25 NOTE — HP
Date/Time of Note


Date/Time of Note


DATE: 19 


TIME: 16:49





OB - History


Hx of Present Pregnancy


Free Text/Dictation


43-year-old female  2 para 1 at 40+ weeks gestation admitted for 


induction of labor


Estimation of fetal weight today was 4200 g


Last Menstrual Period:  May 21, 2018


Estimated Due Date:  2019


:  2


Para:  1


Prenatal Care:  Good Care


Ultrasounds:  Normal mid trimester US


Obstetrical Complications:  None, Other (Advanced maternal age)





Past Family/Social History


*


Past Medical, Surgical, Family and Obstetric Histories reviewed from prenatal 


chart.


Blood Type:  A+


Rubella:  immune


RPR/VDRL:  Negative


GBS Status:  Negative


HBsAG:  Negative





OB  Admission Exam


Vital Signs


Vital Signs





Vital Signs


  Date      Temp  Pulse  Resp  B/P (MAP)   Pulse Ox  O2          O2 Flow    FiO2


Time                                                 Delivery    Rate


   19  97.0     82    82      107/66            Room Air


     09:10                           (80)








Physical Exam


HEENT:  WNL


Heart:  Rhythm Normal


Lungs:  Clear, Equal


Abdomen:  WNL


Extremities:  Normal


Reflexes:  Normal


Cervical Dilatation:  2cm


Effacement:  25%


Station:  -3


Membranes:  Intact


Fetal Heart Rate:  140's


Accelerations:  Accelerations Present


Decelerations:  No Decelerations


Varibility:  Marked


Contractions on Admission:  None


Last 72 hours Lab Results


                                    CBC & BMP


19 11:13











OB  Assessment/Plan


Other Assessment:


Term gestation


Possible macrosomia


Other plan:


Start Pitocin augmentation or induction of contractions











ABBY MARIO MD   2019 16:55

## 2019-02-26 VITALS — DIASTOLIC BLOOD PRESSURE: 56 MMHG | SYSTOLIC BLOOD PRESSURE: 99 MMHG | RESPIRATION RATE: 18 BRPM | HEART RATE: 67 BPM

## 2019-02-26 VITALS — SYSTOLIC BLOOD PRESSURE: 96 MMHG | HEART RATE: 62 BPM | DIASTOLIC BLOOD PRESSURE: 50 MMHG | RESPIRATION RATE: 16 BRPM

## 2019-02-26 PROCEDURE — 0HQ9XZZ REPAIR PERINEUM SKIN, EXTERNAL APPROACH: ICD-10-PCS | Performed by: OBSTETRICS & GYNECOLOGY

## 2019-02-26 PROCEDURE — 0HQ9XZZ REPAIR PERINEUM SKIN, EXTERNAL APPROACH: ICD-10-PCS

## 2019-02-26 RX ADMIN — PYRIDOXINE HYDROCHLORIDE 1 MLS/HR: 100 INJECTION, SOLUTION INTRAMUSCULAR; INTRAVENOUS at 18:06

## 2019-02-26 RX ADMIN — KETOROLAC TROMETHAMINE 1 MG: 30 INJECTION, SOLUTION INTRAMUSCULAR at 18:24

## 2019-02-26 RX ADMIN — PYRIDOXINE HYDROCHLORIDE SCH MLS/HR: 100 INJECTION, SOLUTION INTRAMUSCULAR; INTRAVENOUS at 08:41

## 2019-02-26 RX ADMIN — DOCUSATE SODIUM AND SENNOSIDES 1 TAB: 8.6; 5 TABLET, FILM COATED ORAL at 21:00

## 2019-02-26 RX ADMIN — CEPHALEXIN 1 MG: 500 CAPSULE ORAL at 18:53

## 2019-02-26 RX ADMIN — PYRIDOXINE HYDROCHLORIDE 1 MLS/HR: 100 INJECTION, SOLUTION INTRAMUSCULAR; INTRAVENOUS at 08:41

## 2019-02-26 RX ADMIN — FENTANYL CIT 0.2 MG/100ML-ROPIV 0.2%-NACL 0.9% EPIDURAL INJ SCH ML: 2/0.2 SOLUTION at 04:09

## 2019-02-26 RX ADMIN — ONDANSETRON HYDROCHLORIDE 1 MG: 2 INJECTION, SOLUTION INTRAMUSCULAR; INTRAVENOUS at 04:56

## 2019-02-26 RX ADMIN — CEPHALEXIN SCH MG: 500 CAPSULE ORAL at 18:53

## 2019-02-26 RX ADMIN — FENTANYL CIT 0.2 MG/100ML-ROPIV 0.2%-NACL 0.9% EPIDURAL INJ SCH ML: 2/0.2 SOLUTION at 14:42

## 2019-02-26 RX ADMIN — IBUPROFEN 1 MG: 600 TABLET ORAL at 18:53

## 2019-02-26 RX ADMIN — PYRIDOXINE HYDROCHLORIDE 1 MLS/HR: 100 INJECTION, SOLUTION INTRAMUSCULAR; INTRAVENOUS at 02:39

## 2019-02-26 RX ADMIN — IBUPROFEN SCH MG: 600 TABLET ORAL at 18:53

## 2019-02-26 RX ADMIN — MAGNESIUM HYDROXIDE 1 ML: 400 SUSPENSION ORAL at 21:00

## 2019-02-26 RX ADMIN — PYRIDOXINE HYDROCHLORIDE SCH MLS/HR: 100 INJECTION, SOLUTION INTRAMUSCULAR; INTRAVENOUS at 18:06

## 2019-02-26 RX ADMIN — PYRIDOXINE HYDROCHLORIDE SCH MLS/HR: 100 INJECTION, SOLUTION INTRAMUSCULAR; INTRAVENOUS at 02:39

## 2019-02-26 RX ADMIN — FENTANYL CIT 0.2 MG/100ML-ROPIV 0.2%-NACL 0.9% EPIDURAL INJ 1 ML: 2/0.2 SOLUTION at 14:42

## 2019-02-26 RX ADMIN — FENTANYL CIT 0.2 MG/100ML-ROPIV 0.2%-NACL 0.9% EPIDURAL INJ 1 ML: 2/0.2 SOLUTION at 04:09

## 2019-02-26 RX ADMIN — Medication 1 MLS/HR: at 16:44

## 2019-02-26 RX ADMIN — DOCUSATE SODIUM AND SENNOSIDES SCH TAB: 8.6; 5 TABLET, FILM COATED ORAL at 21:00

## 2019-02-26 NOTE — LDN
Date/Time of Note


Date/Time of Note


DATE: 2/26/19 


TIME: 16:48





Delivery Summary


Normal spontaneous vaginal delivery of viable infant over intact perineum


Weeks of Gestation


40 weeks plus


Placenta Delivered:  Spontaneously, Intact & Complete


Meconium:  none


Episiotomy:  No


Perineal laceration:  1


Laceration repair:  


First-degree perineal laceration was repaired in layers using 2-0 Vicryl


stitch


Anesthesia type:  Epidural


Estimated blood loss:  300


Sponge & Needle done & correct:  Yes


All needle counts correct:  Yes


Any foreign bodies felt in the:  No





Infant Delivery Information


Sex


Infant Sex:  male





Apgars


1 Minute:  9


5 Minute:  9





Suctioning


Nose & mouth suctioned at delaeny:  Yes


Delee suction performed:  No





Umbilical Cord


Umbilical cord with:  3 Vessels


Cord presentations:  nuchal cord


Nuchal cord present X:  1


Cord Blood was obtained:  Yes





Mother & Baby Disposition


Disposition


Mom & Baby to Maternity; Good:  Yes (Mother and baby were recovered in good 


condition)


Mom transferred to:  Other (Maternity)


Baby to NICU:  No











ABBY MARIO MD   Feb 26, 2019 16:53

## 2019-02-27 VITALS — HEART RATE: 58 BPM | SYSTOLIC BLOOD PRESSURE: 102 MMHG | RESPIRATION RATE: 19 BRPM | DIASTOLIC BLOOD PRESSURE: 53 MMHG

## 2019-02-27 VITALS — HEART RATE: 63 BPM | RESPIRATION RATE: 18 BRPM | SYSTOLIC BLOOD PRESSURE: 96 MMHG | DIASTOLIC BLOOD PRESSURE: 57 MMHG

## 2019-02-27 VITALS — RESPIRATION RATE: 16 BRPM | HEART RATE: 64 BPM | SYSTOLIC BLOOD PRESSURE: 91 MMHG | DIASTOLIC BLOOD PRESSURE: 55 MMHG

## 2019-02-27 VITALS — SYSTOLIC BLOOD PRESSURE: 92 MMHG | HEART RATE: 59 BPM | RESPIRATION RATE: 18 BRPM | DIASTOLIC BLOOD PRESSURE: 53 MMHG

## 2019-02-27 VITALS — DIASTOLIC BLOOD PRESSURE: 52 MMHG | SYSTOLIC BLOOD PRESSURE: 92 MMHG | HEART RATE: 60 BPM | RESPIRATION RATE: 16 BRPM

## 2019-02-27 LAB
ADD MAN DIFF?: NO
BASOPHIL #: 0 10^3/UL (ref 0–0.1)
BASOPHILS %: 0.3 % (ref 0–2)
EOSINOPHILS #: 0.1 10^3/UL (ref 0–0.5)
EOSINOPHILS %: 0.4 % (ref 0–7)
HEMATOCRIT: 31.3 % (ref 37–47)
HEMOGLOBIN: 10.3 G/DL (ref 12–16)
IMMATURE GRANS #M: 0.1 10^3/UL (ref 0–0.03)
IMMATURE GRANS % (M): 0.8 % (ref 0–0.43)
LYMPHOCYTES #: 0.9 10^3/UL (ref 0.8–2.9)
LYMPHOCYTES %: 7.7 % (ref 15–51)
MEAN CORPUSCULAR HEMOGLOBIN: 32.2 PG (ref 29–33)
MEAN CORPUSCULAR HGB CONC: 32.9 G/DL (ref 32–37)
MEAN CORPUSCULAR VOLUME: 97.8 FL (ref 82–101)
MEAN PLATELET VOLUME: 12.7 FL (ref 7.4–10.4)
MONOCYTE #: 0.9 10^3/UL (ref 0.3–0.9)
MONOCYTES %: 7.1 % (ref 0–11)
NEUTROPHIL #: 10.2 10^3/UL (ref 1.6–7.5)
NEUTROPHILS %: 83.7 % (ref 39–77)
NUCLEATED RED BLOOD CELLS #: 0 10^3/UL (ref 0–0)
NUCLEATED RED BLOOD CELLS%: 0 /100WBC (ref 0–0)
PLATELET COUNT: 122 10^3/UL (ref 140–415)
RED BLOOD COUNT: 3.2 10^6/UL (ref 4.2–5.4)
RED CELL DISTRIBUTION WIDTH: 12.9 % (ref 11.5–14.5)
WHITE BLOOD COUNT: 12.2 10^3/UL (ref 4.8–10.8)

## 2019-02-27 RX ADMIN — IBUPROFEN SCH MG: 600 TABLET ORAL at 17:27

## 2019-02-27 RX ADMIN — IBUPROFEN SCH MG: 600 TABLET ORAL at 12:17

## 2019-02-27 RX ADMIN — CEPHALEXIN SCH MG: 500 CAPSULE ORAL at 12:17

## 2019-02-27 RX ADMIN — PYRIDOXINE HYDROCHLORIDE 1 MLS/HR: 100 INJECTION, SOLUTION INTRAMUSCULAR; INTRAVENOUS at 10:06

## 2019-02-27 RX ADMIN — PYRIDOXINE HYDROCHLORIDE 1 MLS/HR: 100 INJECTION, SOLUTION INTRAMUSCULAR; INTRAVENOUS at 02:06

## 2019-02-27 RX ADMIN — IBUPROFEN 1 MG: 600 TABLET ORAL at 06:25

## 2019-02-27 RX ADMIN — DOCUSATE SODIUM AND SENNOSIDES SCH TAB: 8.6; 5 TABLET, FILM COATED ORAL at 09:00

## 2019-02-27 RX ADMIN — PYRIDOXINE HYDROCHLORIDE SCH MLS/HR: 100 INJECTION, SOLUTION INTRAMUSCULAR; INTRAVENOUS at 18:06

## 2019-02-27 RX ADMIN — DOCUSATE SODIUM AND SENNOSIDES 1 TAB: 8.6; 5 TABLET, FILM COATED ORAL at 21:00

## 2019-02-27 RX ADMIN — CEPHALEXIN 1 MG: 500 CAPSULE ORAL at 06:25

## 2019-02-27 RX ADMIN — MAGNESIUM HYDROXIDE 1 ML: 400 SUSPENSION ORAL at 21:00

## 2019-02-27 RX ADMIN — MAGNESIUM HYDROXIDE 1 ML: 400 SUSPENSION ORAL at 09:00

## 2019-02-27 RX ADMIN — DOCUSATE SODIUM AND SENNOSIDES 1 TAB: 8.6; 5 TABLET, FILM COATED ORAL at 09:00

## 2019-02-27 RX ADMIN — IBUPROFEN SCH MG: 600 TABLET ORAL at 00:43

## 2019-02-27 RX ADMIN — CEPHALEXIN 1 MG: 500 CAPSULE ORAL at 12:17

## 2019-02-27 RX ADMIN — PYRIDOXINE HYDROCHLORIDE SCH MLS/HR: 100 INJECTION, SOLUTION INTRAMUSCULAR; INTRAVENOUS at 10:06

## 2019-02-27 RX ADMIN — PYRIDOXINE HYDROCHLORIDE 1 MLS/HR: 100 INJECTION, SOLUTION INTRAMUSCULAR; INTRAVENOUS at 18:06

## 2019-02-27 RX ADMIN — IBUPROFEN 1 MG: 600 TABLET ORAL at 17:27

## 2019-02-27 RX ADMIN — CEPHALEXIN SCH MG: 500 CAPSULE ORAL at 00:43

## 2019-02-27 RX ADMIN — PYRIDOXINE HYDROCHLORIDE SCH MLS/HR: 100 INJECTION, SOLUTION INTRAMUSCULAR; INTRAVENOUS at 02:06

## 2019-02-27 RX ADMIN — CEPHALEXIN SCH MG: 500 CAPSULE ORAL at 17:27

## 2019-02-27 RX ADMIN — DOCUSATE SODIUM AND SENNOSIDES SCH TAB: 8.6; 5 TABLET, FILM COATED ORAL at 21:00

## 2019-02-27 RX ADMIN — CEPHALEXIN 1 MG: 500 CAPSULE ORAL at 00:43

## 2019-02-27 RX ADMIN — IBUPROFEN 1 MG: 600 TABLET ORAL at 12:17

## 2019-02-27 RX ADMIN — CEPHALEXIN 1 MG: 500 CAPSULE ORAL at 17:27

## 2019-02-27 RX ADMIN — IBUPROFEN SCH MG: 600 TABLET ORAL at 06:25

## 2019-02-27 RX ADMIN — IBUPROFEN 1 MG: 600 TABLET ORAL at 00:43

## 2019-02-27 RX ADMIN — CEPHALEXIN SCH MG: 500 CAPSULE ORAL at 06:25

## 2019-02-27 NOTE — DS
Date/Time of Note


Date/Time of Note


Home today or next day


DATE: 2/27/19 


TIME: 14:53





Obstetrical Discharge Record


Final Diagnosis


Final Diagnosis:  Term delivered


Other Final Diagnosis


Status post vaginal delivery





Vaginal Delivery


Obstetrical Delivery:  Spontaneous, Laceration, Repaired





Complications


Augmentation:  Yes


Induction:  Yes





Condition on Discharge


Physical Assessment


Last Vitals:


See nurse's notes


Voiding:  Yes


Bowel Movement:  Yes


Breast:  Soft, non-tender, Filling


Fundus:  Firm


Abdomen and Incision:


Abdomen is soft with firm fundus


Episiotomy:


Perineum is clean and appears to be healing well


Calf Tenderness:  No


Patient Condition:  Good











ABBY MARIO MD   Feb 27, 2019 14:54

## 2019-02-28 VITALS — RESPIRATION RATE: 19 BRPM | DIASTOLIC BLOOD PRESSURE: 73 MMHG | HEART RATE: 65 BPM | SYSTOLIC BLOOD PRESSURE: 114 MMHG

## 2019-02-28 VITALS — HEART RATE: 61 BPM | RESPIRATION RATE: 19 BRPM | SYSTOLIC BLOOD PRESSURE: 92 MMHG | DIASTOLIC BLOOD PRESSURE: 53 MMHG

## 2019-02-28 LAB
ABNORMAL IP MESSAGE: 1
ADD MAN DIFF?: NO
BASOPHIL #: 0 10^3/UL (ref 0–0.1)
BASOPHILS %: 0.4 % (ref 0–2)
EOSINOPHILS #: 0.2 10^3/UL (ref 0–0.5)
EOSINOPHILS %: 2 % (ref 0–7)
HEMATOCRIT: 33.4 % (ref 37–47)
HEMOGLOBIN: 11.2 G/DL (ref 12–16)
IMMATURE GRANS #M: 0.1 10^3/UL (ref 0–0.03)
IMMATURE GRANS % (M): 1.2 % (ref 0–0.43)
LYMPHOCYTES #: 1.5 10^3/UL (ref 0.8–2.9)
LYMPHOCYTES %: 18 % (ref 15–51)
MEAN CORPUSCULAR HEMOGLOBIN: 32.9 PG (ref 29–33)
MEAN CORPUSCULAR HGB CONC: 33.5 G/DL (ref 32–37)
MEAN CORPUSCULAR VOLUME: 98.2 FL (ref 82–101)
MEAN PLATELET VOLUME: 13.1 FL (ref 7.4–10.4)
MONOCYTE #: 0.6 10^3/UL (ref 0.3–0.9)
MONOCYTES %: 6.6 % (ref 0–11)
NEUTROPHIL #: 6 10^3/UL (ref 1.6–7.5)
NEUTROPHILS %: 71.8 % (ref 39–77)
NUCLEATED RED BLOOD CELLS #: 0 10^3/UL (ref 0–0)
NUCLEATED RED BLOOD CELLS%: 0 /100WBC (ref 0–0)
PLATELET COUNT: 133 10^3/UL (ref 140–415)
POSITIVE DIFF: (no result)
RED BLOOD COUNT: 3.4 10^6/UL (ref 4.2–5.4)
RED CELL DISTRIBUTION WIDTH: 13.2 % (ref 11.5–14.5)
WHITE BLOOD COUNT: 8.3 10^3/UL (ref 4.8–10.8)

## 2019-02-28 RX ADMIN — CEPHALEXIN SCH MG: 500 CAPSULE ORAL at 06:24

## 2019-02-28 RX ADMIN — IBUPROFEN 1 MG: 600 TABLET ORAL at 06:24

## 2019-02-28 RX ADMIN — MAGNESIUM HYDROXIDE 1 ML: 400 SUSPENSION ORAL at 09:00

## 2019-02-28 RX ADMIN — IBUPROFEN 1 MG: 600 TABLET ORAL at 12:04

## 2019-02-28 RX ADMIN — DOCUSATE SODIUM AND SENNOSIDES 1 TAB: 8.6; 5 TABLET, FILM COATED ORAL at 09:00

## 2019-02-28 RX ADMIN — PYRIDOXINE HYDROCHLORIDE SCH MLS/HR: 100 INJECTION, SOLUTION INTRAMUSCULAR; INTRAVENOUS at 02:06

## 2019-02-28 RX ADMIN — IBUPROFEN 1 MG: 600 TABLET ORAL at 00:11

## 2019-02-28 RX ADMIN — CEPHALEXIN SCH MG: 500 CAPSULE ORAL at 12:04

## 2019-02-28 RX ADMIN — VARICELLA VIRUS VACCINE LIVE 1 UNIT: 1350 INJECTION, POWDER, LYOPHILIZED, FOR SUSPENSION SUBCUTANEOUS at 09:00

## 2019-02-28 RX ADMIN — MEASLES, MUMPS, AND RUBELLA VIRUS VACCINE LIVE 1 ML: 1000; 12500; 1000 INJECTION, POWDER, LYOPHILIZED, FOR SUSPENSION SUBCUTANEOUS at 08:24

## 2019-02-28 RX ADMIN — DOCUSATE SODIUM AND SENNOSIDES SCH TAB: 8.6; 5 TABLET, FILM COATED ORAL at 09:00

## 2019-02-28 RX ADMIN — IBUPROFEN SCH MG: 600 TABLET ORAL at 06:24

## 2019-02-28 RX ADMIN — IBUPROFEN SCH MG: 600 TABLET ORAL at 00:11

## 2019-02-28 RX ADMIN — IBUPROFEN SCH MG: 600 TABLET ORAL at 12:04

## 2019-02-28 RX ADMIN — CEPHALEXIN 1 MG: 500 CAPSULE ORAL at 12:04

## 2019-02-28 RX ADMIN — CEPHALEXIN 1 MG: 500 CAPSULE ORAL at 06:24

## 2019-02-28 RX ADMIN — CEPHALEXIN SCH MG: 500 CAPSULE ORAL at 00:12

## 2019-02-28 RX ADMIN — CLOSTRIDIUM TETANI TOXOID ANTIGEN (FORMALDEHYDE INACTIVATED), CORYNEBACTERIUM DIPHTHERIAE TOXOID ANTIGEN (FORMALDEHYDE INACTIVATED), BORDETELLA PERTUSSIS TOXOID ANTIGEN (GLUTARALDEHYDE INACTIVATED), BORDETELLA PERTUSSIS FILAMENTOUS HEMAGGLUTININ ANTIGEN (FORMALDEHYDE INACTIVATED), BORDETELLA PERTUSSIS PERTACTIN ANTIGEN, AND BORDETELLA PERTUSSIS FIMBRIAE 2/3 ANTIGEN 1 ML: 5; 2; 2.5; 5; 3; 5 INJECTION, SUSPENSION INTRAMUSCULAR at 08:24

## 2019-02-28 RX ADMIN — CEPHALEXIN 1 MG: 500 CAPSULE ORAL at 00:12

## 2019-02-28 RX ADMIN — PYRIDOXINE HYDROCHLORIDE 1 MLS/HR: 100 INJECTION, SOLUTION INTRAMUSCULAR; INTRAVENOUS at 02:06

## 2021-09-10 NOTE — PD.PPDC
Check XR  Trial of topical voltaren, continue with supportive care  Anticipatory guidance given     OB/GYN Discharge Instruction


Provider Information


Physician Information


43-year-old female had vaginal delivery





Diagnosis


      [id number]
Final Diagnosis:  [id number]
Status post vaginal delivery








Condition


                 [id number]
Patient Condition:  Ibnha7l
Good








Diet


                [id number]
Diet:  [id number]
Resume Regular Diet








Activity/Restrictions


     [id number]
Activity:                  [id number]
Normal Activity


                                           May Shower


     Lstsq4Vd
Restrictions:              [id number]
Nothing in the Vagina


     [id number]
Return to Work or School:  [id number]
Apr 15, 2019








Follow-up


Follow-up with Physician:  2, 4, Week/Weeks (Clinic)





Return to clinic for


            Csilh4Bv
OB Instructions:  Byvdg2g
Breast Tenderness


                                         Depression





Comment:


Pelvic rest times 6 weeks











ABBY MARIO MD   Feb 27, 2019 14:55